# Patient Record
Sex: FEMALE | Race: WHITE | NOT HISPANIC OR LATINO | Employment: OTHER | URBAN - METROPOLITAN AREA
[De-identification: names, ages, dates, MRNs, and addresses within clinical notes are randomized per-mention and may not be internally consistent; named-entity substitution may affect disease eponyms.]

---

## 2017-04-02 PROBLEM — S92.901A CLOSED FRACTURE OF RIGHT FOOT: Status: ACTIVE | Noted: 2017-04-02

## 2017-04-02 PROBLEM — Z60.9 SOCIAL PROBLEM: Status: ACTIVE | Noted: 2017-04-02

## 2017-04-02 PROBLEM — S92.901A FOOT FRACTURE, RIGHT: Status: ACTIVE | Noted: 2017-04-02

## 2017-04-11 ENCOUNTER — HOSPITAL ENCOUNTER (OUTPATIENT)
Dept: RADIOLOGY | Facility: HOSPITAL | Age: 82
Discharge: HOME | End: 2017-04-11
Attending: ORTHOPAEDIC SURGERY
Payer: MEDICARE

## 2017-04-11 ENCOUNTER — OFFICE VISIT (OUTPATIENT)
Dept: ORTHOPEDICS | Facility: CLINIC | Age: 82
End: 2017-04-11
Payer: MEDICARE

## 2017-04-11 VITALS
HEART RATE: 76 BPM | DIASTOLIC BLOOD PRESSURE: 66 MMHG | BODY MASS INDEX: 19.17 KG/M2 | WEIGHT: 115.06 LBS | HEIGHT: 65 IN | SYSTOLIC BLOOD PRESSURE: 111 MMHG

## 2017-04-11 DIAGNOSIS — S92.901D: Primary | ICD-10-CM

## 2017-04-11 DIAGNOSIS — M79.671 RIGHT FOOT PAIN: Primary | ICD-10-CM

## 2017-04-11 DIAGNOSIS — M79.671 RIGHT FOOT PAIN: ICD-10-CM

## 2017-04-11 PROCEDURE — 73630 X-RAY EXAM OF FOOT: CPT | Mod: 26,RT,, | Performed by: RADIOLOGY

## 2017-04-11 PROCEDURE — 73630 X-RAY EXAM OF FOOT: CPT | Mod: TC,PO,RT

## 2017-04-11 PROCEDURE — 29405 APPL SHORT LEG CAST: CPT | Mod: 58,S$PBB,RT, | Performed by: ORTHOPAEDIC SURGERY

## 2017-04-11 PROCEDURE — 99999 PR PBB SHADOW E&M-EST. PATIENT-LVL III: CPT | Mod: PBBFAC,,, | Performed by: ORTHOPAEDIC SURGERY

## 2017-04-11 PROCEDURE — 99024 POSTOP FOLLOW-UP VISIT: CPT | Mod: S$PBB,,, | Performed by: ORTHOPAEDIC SURGERY

## 2017-04-11 NOTE — PROGRESS NOTES
Subjective:      Patient ID: Silvana Law is a 86 y.o. female.    Chief Complaint: Pain and Injury of the Right Foot (DOI: 4-2-17/Fell )    Doing fairly well s/p right foot multiple metatarsal frxs due to fall at home. She was seen by me at [facility] as an inpatient. She rates her pain as 1/10 today.     Social History     Occupational History    Not on file.     Social History Main Topics    Smoking status: Never Smoker    Smokeless tobacco: Never Used    Alcohol use No    Drug use: No    Sexual activity: Not on file            Objective:    Ortho Exam     RLE: neurovascularly intact, blister to dorsal foot, severe swelling and ecchymosis of the foot and toes.  tenderness to palpation at the fracture sites.       XRAYS: 3 views of right foot obtained and reviewed today reveal no change in alignment healing or frxs at the bases of the 2nd, 3rd and 4th metatarsals noted.   Assessment:       1. Closed fracture of right foot, with routine healing, subsequent encounter          Plan:       Blister unroofed using sterile technique. Short leg cast applied. Non-weightbearing. F/u 2 weeks with xray out of plaster right foot.

## 2017-04-19 DIAGNOSIS — S92.901D: Primary | ICD-10-CM

## 2017-04-25 ENCOUNTER — OFFICE VISIT (OUTPATIENT)
Dept: ORTHOPEDICS | Facility: CLINIC | Age: 82
End: 2017-04-25
Payer: MEDICARE

## 2017-04-25 ENCOUNTER — HOSPITAL ENCOUNTER (OUTPATIENT)
Dept: RADIOLOGY | Facility: HOSPITAL | Age: 82
Discharge: HOME | End: 2017-04-25
Attending: ORTHOPAEDIC SURGERY
Payer: MEDICARE

## 2017-04-25 VITALS
HEART RATE: 76 BPM | WEIGHT: 115 LBS | BODY MASS INDEX: 19.16 KG/M2 | SYSTOLIC BLOOD PRESSURE: 115 MMHG | DIASTOLIC BLOOD PRESSURE: 70 MMHG | HEIGHT: 65 IN

## 2017-04-25 DIAGNOSIS — S92.901D: Primary | ICD-10-CM

## 2017-04-25 DIAGNOSIS — S92.901D: ICD-10-CM

## 2017-04-25 PROBLEM — Z60.9 SOCIAL PROBLEM: Status: RESOLVED | Noted: 2017-04-02 | Resolved: 2017-04-25

## 2017-04-25 PROBLEM — S92.901A FOOT FRACTURE, RIGHT: Status: RESOLVED | Noted: 2017-04-02 | Resolved: 2017-04-25

## 2017-04-25 PROCEDURE — 99024 POSTOP FOLLOW-UP VISIT: CPT | Mod: S$PBB,,, | Performed by: ORTHOPAEDIC SURGERY

## 2017-04-25 PROCEDURE — 97760 ORTHOTIC MGMT&TRAING 1ST ENC: CPT | Mod: GP,S$PBB,, | Performed by: ORTHOPAEDIC SURGERY

## 2017-04-25 PROCEDURE — 99999 PR PBB SHADOW E&M-EST. PATIENT-LVL III: CPT | Mod: PBBFAC,,, | Performed by: ORTHOPAEDIC SURGERY

## 2017-04-25 PROCEDURE — 73630 X-RAY EXAM OF FOOT: CPT | Mod: 26,RT,, | Performed by: RADIOLOGY

## 2017-04-25 PROCEDURE — 99213 OFFICE O/P EST LOW 20 MIN: CPT | Mod: PBBFAC,PO | Performed by: ORTHOPAEDIC SURGERY

## 2017-04-25 NOTE — PROGRESS NOTES
Subjective:      Patient ID: Silvana Law is a 86 y.o. female.    Chief Complaint: Injury of the Right Foot (DOI: 4-2-17/Fell )    Doing  well s/p right foot multiple metatarsal frxs due to fall at home.  She rates her pain as 0/10 today.     Social History     Occupational History    Not on file.     Social History Main Topics    Smoking status: Never Smoker    Smokeless tobacco: Never Used    Alcohol use No    Drug use: No    Sexual activity: Not on file            Objective:    Ortho Exam     RLE: neurovascularly intact, blister to dorsal foot is healed, moderate swelling and ecchymosis of the foot and toes. Mild to moderate tenderness to palpation at the fracture sites.       XRAYS: 3 views of right foot obtained and reviewed today reveal some interval progression of healing or frxs at the bases of the 2nd, 3rd and 4th metatarsals noted.   Assessment:       1. Closed fracture of right foot, with routine healing, subsequent encounter          Plan:       Weight bearing as tolerated on heel in cam boot. We performed a custom orthotic/brace adjustment, fitting and training with the patient today. The patient demonstrated understanding and proper care. This was performed for 15 minutes.  Short cam boot  was given.   F/u 3 weeks with xray of the right foot.

## 2017-05-15 DIAGNOSIS — S92.901D: Primary | ICD-10-CM

## 2017-05-16 ENCOUNTER — OFFICE VISIT (OUTPATIENT)
Dept: ORTHOPEDICS | Facility: CLINIC | Age: 82
End: 2017-05-16
Payer: MEDICARE

## 2017-05-16 ENCOUNTER — HOSPITAL ENCOUNTER (OUTPATIENT)
Dept: RADIOLOGY | Facility: HOSPITAL | Age: 82
Discharge: HOME | End: 2017-05-16
Attending: ORTHOPAEDIC SURGERY
Payer: MEDICARE

## 2017-05-16 VITALS
HEART RATE: 69 BPM | WEIGHT: 123 LBS | HEIGHT: 65 IN | DIASTOLIC BLOOD PRESSURE: 66 MMHG | BODY MASS INDEX: 20.49 KG/M2 | SYSTOLIC BLOOD PRESSURE: 112 MMHG

## 2017-05-16 DIAGNOSIS — S92.901D: Primary | ICD-10-CM

## 2017-05-16 DIAGNOSIS — S92.901D: ICD-10-CM

## 2017-05-16 PROCEDURE — 99999 PR PBB SHADOW E&M-EST. PATIENT-LVL III: CPT | Mod: PBBFAC,,, | Performed by: ORTHOPAEDIC SURGERY

## 2017-05-16 PROCEDURE — 99213 OFFICE O/P EST LOW 20 MIN: CPT | Mod: PBBFAC,25,PO | Performed by: ORTHOPAEDIC SURGERY

## 2017-05-16 PROCEDURE — 73630 X-RAY EXAM OF FOOT: CPT | Mod: 26,RT,, | Performed by: RADIOLOGY

## 2017-05-16 PROCEDURE — 99024 POSTOP FOLLOW-UP VISIT: CPT | Mod: ,,, | Performed by: ORTHOPAEDIC SURGERY

## 2017-05-16 NOTE — PROGRESS NOTES
Subjective:      Patient ID: Silvana Law is a 86 y.o. female.    Chief Complaint: Injury of the Right Foot (DOI 4/2/17)    Doing very well s/p right foot multiple metatarsal frxs due to fall at home.  She rates her pain as 0/10 today.     Social History     Occupational History    Not on file.     Social History Main Topics    Smoking status: Never Smoker    Smokeless tobacco: Never Used    Alcohol use No    Drug use: No    Sexual activity: Not on file            Objective:    Ortho Exam     RLE: neurovascularly intact, blister to dorsal foot is healed, mild to moderate swelling  of the foot and toes. No tenderness to palpation at the fracture sites.       XRAYS: 3 views of right foot obtained and reviewed today reveal some interval progression of healing or frxs at the bases of the 2nd, 3rd and 4th metatarsals noted.   Assessment:       1. Closed fracture of right foot, with routine healing, subsequent encounter          Plan:       Weight bearing as tolerated in boot and ok to transition to regular shoe. Home health PT for gait training. F/u 6 weeks with xray of the right foot.

## 2017-06-23 DIAGNOSIS — S92.901D: Primary | ICD-10-CM

## 2017-06-27 ENCOUNTER — HOSPITAL ENCOUNTER (OUTPATIENT)
Dept: RADIOLOGY | Facility: HOSPITAL | Age: 82
Discharge: HOME | End: 2017-06-27
Attending: ORTHOPAEDIC SURGERY
Payer: MEDICARE

## 2017-06-27 ENCOUNTER — OFFICE VISIT (OUTPATIENT)
Dept: ORTHOPEDICS | Facility: CLINIC | Age: 82
End: 2017-06-27
Payer: MEDICARE

## 2017-06-27 VITALS
WEIGHT: 123 LBS | BODY MASS INDEX: 20.49 KG/M2 | SYSTOLIC BLOOD PRESSURE: 127 MMHG | DIASTOLIC BLOOD PRESSURE: 67 MMHG | HEART RATE: 65 BPM | HEIGHT: 65 IN

## 2017-06-27 DIAGNOSIS — S92.901D: Primary | ICD-10-CM

## 2017-06-27 DIAGNOSIS — S92.901D: ICD-10-CM

## 2017-06-27 PROCEDURE — 99213 OFFICE O/P EST LOW 20 MIN: CPT | Mod: PBBFAC,25,PO | Performed by: ORTHOPAEDIC SURGERY

## 2017-06-27 PROCEDURE — 99999 PR PBB SHADOW E&M-EST. PATIENT-LVL III: CPT | Mod: PBBFAC,,, | Performed by: ORTHOPAEDIC SURGERY

## 2017-06-27 PROCEDURE — 99024 POSTOP FOLLOW-UP VISIT: CPT | Mod: ,,, | Performed by: ORTHOPAEDIC SURGERY

## 2017-06-27 PROCEDURE — 73630 X-RAY EXAM OF FOOT: CPT | Mod: 26,RT,, | Performed by: RADIOLOGY

## 2017-06-27 NOTE — PROGRESS NOTES
Subjective:      Patient ID: Silvana Law is a 87 y.o. female.    Chief Complaint: Injury and Pain of the Right Foot (DOI: 4-2-17/)    Doing very well s/p right foot multiple metatarsal frxs due to fall at home.  She rates her pain as 0/10 today.     Social History     Occupational History    Not on file.     Social History Main Topics    Smoking status: Never Smoker    Smokeless tobacco: Never Used    Alcohol use No    Drug use: No    Sexual activity: Not on file            Objective:    Ortho Exam     RLE: neurovascularly intact, blister to dorsal foot is healed, mild swelling of the toes. No tenderness to palpation at the fracture sites.       XRAYS: 3 views of right foot obtained and reviewed today reveal healed frxs at the bases of the 2nd, 3rd and 4th metatarsals noted.     Assessment:       Healed metatarsal fractures    Plan:       Weight bearing as tolerated in regular shoe. F/u prn.

## 2019-01-02 PROBLEM — Z86.73 HISTORY OF LACUNAR CEREBROVASCULAR ACCIDENT (CVA): Status: ACTIVE | Noted: 2019-01-02

## 2021-12-13 ENCOUNTER — LAB VISIT (OUTPATIENT)
Dept: FAMILY MEDICINE | Facility: CLINIC | Age: 86
End: 2021-12-13
Payer: MEDICARE

## 2021-12-13 DIAGNOSIS — R41.3 MEMORY LOSS: ICD-10-CM

## 2021-12-13 DIAGNOSIS — R63.4 WEIGHT LOSS: ICD-10-CM

## 2021-12-13 DIAGNOSIS — E03.9 HYPOTHYROIDISM, UNSPECIFIED TYPE: ICD-10-CM

## 2021-12-13 PROCEDURE — 36415 COLL VENOUS BLD VENIPUNCTURE: CPT | Performed by: FAMILY MEDICINE

## 2022-02-10 ENCOUNTER — TELEPHONE (OUTPATIENT)
Dept: SPINE | Facility: CLINIC | Age: 87
End: 2022-02-10
Payer: MEDICARE

## 2022-02-10 NOTE — TELEPHONE ENCOUNTER
----- Message from Caridad Acosta RRT sent at 2/9/2022  7:02 PM CST -----  Regarding: L3 compression fx  Patient needs follow up due to L3 compression fx.  Referral has been placed.  Please forward to neurosurgery if you think she needs it.      Her daughter will be coming in town this Friday if there is any way it can be done then.  I know that's asking a lot, but the patient wanted me to pass it along.       Thanks!    YANELY Hall, RRT  ED Navigator, Case Management  [phone]  [facility]

## 2022-02-11 NOTE — TELEPHONE ENCOUNTER
patient reports that she is going to [place] with her daughter for several weeks. States that she is leaving today. Advise patient to seek care in [place] when she gets there.